# Patient Record
Sex: MALE | Race: WHITE | HISPANIC OR LATINO | URBAN - NONMETROPOLITAN AREA
[De-identification: names, ages, dates, MRNs, and addresses within clinical notes are randomized per-mention and may not be internally consistent; named-entity substitution may affect disease eponyms.]

---

## 2018-03-20 ENCOUNTER — APPOINTMENT (RX ONLY)
Dept: URBAN - NONMETROPOLITAN AREA CLINIC 28 | Facility: CLINIC | Age: 23
Setting detail: DERMATOLOGY
End: 2018-03-20

## 2018-03-20 VITALS
WEIGHT: 220 LBS | DIASTOLIC BLOOD PRESSURE: 90 MMHG | HEART RATE: 89 BPM | SYSTOLIC BLOOD PRESSURE: 129 MMHG | HEIGHT: 69 IN

## 2018-03-20 DIAGNOSIS — L21.8 OTHER SEBORRHEIC DERMATITIS: ICD-10-CM

## 2018-03-20 PROBLEM — L29.8 OTHER PRURITUS: Status: ACTIVE | Noted: 2018-03-20

## 2018-03-20 PROBLEM — L20.84 INTRINSIC (ALLERGIC) ECZEMA: Status: ACTIVE | Noted: 2018-03-20

## 2018-03-20 PROBLEM — L85.3 XEROSIS CUTIS: Status: ACTIVE | Noted: 2018-03-20

## 2018-03-20 PROCEDURE — ? OTHER

## 2018-03-20 PROCEDURE — ? ADDITIONAL NOTES

## 2018-03-20 PROCEDURE — 99202 OFFICE O/P NEW SF 15 MIN: CPT

## 2018-03-20 PROCEDURE — ? PRESCRIPTION

## 2018-03-20 PROCEDURE — ? COUNSELING

## 2018-03-20 PROCEDURE — ? TREATMENT REGIMEN

## 2018-03-20 RX ORDER — DESONIDE 0.5 MG/G
GEL TOPICAL
Qty: 1 | Refills: 0 | Status: ERX | COMMUNITY
Start: 2018-03-20

## 2018-03-20 RX ORDER — FLUOCINONIDE 0.5 MG/ML
SOLUTION TOPICAL
Qty: 1 | Refills: 1 | Status: ERX | COMMUNITY
Start: 2018-03-20

## 2018-03-20 RX ADMIN — FLUOCINONIDE: 0.5 SOLUTION TOPICAL at 19:53

## 2018-03-20 RX ADMIN — DESONIDE: 0.5 GEL TOPICAL at 20:00

## 2018-03-20 ASSESSMENT — LOCATION SIMPLE DESCRIPTION DERM
LOCATION SIMPLE: LEFT CHEEK
LOCATION SIMPLE: LEFT SCALP

## 2018-03-20 ASSESSMENT — LOCATION DETAILED DESCRIPTION DERM
LOCATION DETAILED: LEFT CENTRAL MALAR CHEEK
LOCATION DETAILED: LEFT MEDIAL FRONTAL SCALP

## 2018-03-20 ASSESSMENT — LOCATION ZONE DERM
LOCATION ZONE: FACE
LOCATION ZONE: SCALP

## 2018-03-20 NOTE — PROCEDURE: TREATMENT REGIMEN
Plan: F/U in 2 weeks for seb derm
Modify Regimen: Alternate 3 dandruff shampoos-one being Nizoral AD shampoo daily with flares, twice weekly for maintenance
Samples Given: Desonate .05% Gel twice daily up to two weeks
Discontinue Regimen: Dove shampoo
Detail Level: Zone
Initiate Treatment: Nizoral twice weekly intermittently with other dandruff shampoo leave in 5 minutes then rinse, Fluocinonide .05% topical solution for scalp and desonate .05% Gel for face and ears BID X2 weeks

## 2018-03-20 NOTE — HPI: RASH
How Severe Is Your Rash?: mild
Is This A New Presentation, Or A Follow-Up?: Rash
Additional History: Patient has also gotten steroid shots from Dr. Vaughan

## 2018-03-20 NOTE — PROCEDURE: OTHER
Detail Level: Zone
Note Text (......Xxx Chief Complaint.): This diagnosis correlates with the
Other (Free Text): Patient is currently using head and shoulders as well as dove shampoo

## 2018-04-03 ENCOUNTER — APPOINTMENT (RX ONLY)
Dept: URBAN - NONMETROPOLITAN AREA CLINIC 28 | Facility: CLINIC | Age: 23
Setting detail: DERMATOLOGY
End: 2018-04-03

## 2018-04-03 DIAGNOSIS — L21.8 OTHER SEBORRHEIC DERMATITIS: ICD-10-CM | Status: WELL CONTROLLED

## 2018-04-03 PROCEDURE — ? TREATMENT REGIMEN

## 2018-04-03 PROCEDURE — ? COUNSELING

## 2018-04-03 PROCEDURE — 99212 OFFICE O/P EST SF 10 MIN: CPT

## 2018-04-03 ASSESSMENT — LOCATION SIMPLE DESCRIPTION DERM
LOCATION SIMPLE: LEFT SCALP
LOCATION SIMPLE: LEFT CHEEK

## 2018-04-03 ASSESSMENT — LOCATION DETAILED DESCRIPTION DERM
LOCATION DETAILED: LEFT CENTRAL MALAR CHEEK
LOCATION DETAILED: LEFT MEDIAL FRONTAL SCALP

## 2018-04-03 ASSESSMENT — LOCATION ZONE DERM
LOCATION ZONE: SCALP
LOCATION ZONE: FACE

## 2018-04-03 NOTE — PROCEDURE: TREATMENT REGIMEN
Plan: Treatment regimen should be done as needed.
Detail Level: Zone
Modify Regimen: Alternate 3 dandruff shampoos-one being Nizoral AD shampoo daily with flares, twice weekly for maintenance. Nizoral twice weekly intermittently with other dandruff shampoo leave in 5 minutes then rinse, Fluocinonide .05% topical solution for scalp and desonate .05% Gel for face and ears BID X2 weeks PRN